# Patient Record
Sex: MALE | Race: WHITE | NOT HISPANIC OR LATINO | Employment: FULL TIME | URBAN - METROPOLITAN AREA
[De-identification: names, ages, dates, MRNs, and addresses within clinical notes are randomized per-mention and may not be internally consistent; named-entity substitution may affect disease eponyms.]

---

## 2017-04-07 ENCOUNTER — TELEPHONE (OUTPATIENT)
Dept: GASTROENTEROLOGY | Facility: CLINIC | Age: 27
End: 2017-04-07

## 2017-04-07 NOTE — TELEPHONE ENCOUNTER
Spoke to Mr. Mcdaniel.  He has outside records from [facility] he will be emailing me.  Let him know once I receive the records i will be reaching out to get him scheduled with Dr. TIFFANY Pulido.    Pt verbalizes understanding and appreciates the call.

## 2017-04-10 ENCOUNTER — TELEPHONE (OUTPATIENT)
Dept: GASTROENTEROLOGY | Facility: CLINIC | Age: 27
End: 2017-04-10

## 2017-04-12 ENCOUNTER — TELEPHONE (OUTPATIENT)
Dept: GASTROENTEROLOGY | Facility: CLINIC | Age: 27
End: 2017-04-12

## 2017-04-12 NOTE — TELEPHONE ENCOUNTER
Attempting to reach Mr. Mcdaniel to give him update on status of appt.    Awaiting records from [facility].    Message left to please return call to office.

## 2017-04-13 ENCOUNTER — TELEPHONE (OUTPATIENT)
Dept: GASTROENTEROLOGY | Facility: CLINIC | Age: 27
End: 2017-04-13

## 2017-04-19 ENCOUNTER — TELEPHONE (OUTPATIENT)
Dept: GASTROENTEROLOGY | Facility: CLINIC | Age: 27
End: 2017-04-19

## 2017-04-19 NOTE — TELEPHONE ENCOUNTER
Creating an encounter to access care everywhere for patient's upcoming appointment on 4/24/2017 with Dr. Pulido.    KG

## 2017-04-24 ENCOUNTER — OFFICE VISIT (OUTPATIENT)
Dept: GASTROENTEROLOGY | Facility: CLINIC | Age: 27
End: 2017-04-24
Payer: COMMERCIAL

## 2017-04-24 ENCOUNTER — TELEPHONE (OUTPATIENT)
Dept: ENDOSCOPY | Facility: HOSPITAL | Age: 27
End: 2017-04-24

## 2017-04-24 ENCOUNTER — TELEPHONE (OUTPATIENT)
Dept: GASTROENTEROLOGY | Facility: CLINIC | Age: 27
End: 2017-04-24

## 2017-04-24 VITALS
DIASTOLIC BLOOD PRESSURE: 81 MMHG | SYSTOLIC BLOOD PRESSURE: 134 MMHG | WEIGHT: 160.69 LBS | RESPIRATION RATE: 18 BRPM | TEMPERATURE: 98 F | BODY MASS INDEX: 23.8 KG/M2 | HEIGHT: 69 IN | HEART RATE: 52 BPM

## 2017-04-24 DIAGNOSIS — K52.9 CHRONIC NONSPECIFIC COLITIS: ICD-10-CM

## 2017-04-24 DIAGNOSIS — Z12.11 SPECIAL SCREENING FOR MALIGNANT NEOPLASMS, COLON: Primary | ICD-10-CM

## 2017-04-24 PROCEDURE — 1160F RVW MEDS BY RX/DR IN RCRD: CPT | Mod: S$GLB,,, | Performed by: INTERNAL MEDICINE

## 2017-04-24 PROCEDURE — 99205 OFFICE O/P NEW HI 60 MIN: CPT | Mod: S$GLB,,, | Performed by: INTERNAL MEDICINE

## 2017-04-24 PROCEDURE — 99999 PR PBB SHADOW E&M-EST. PATIENT-LVL III: CPT | Mod: PBBFAC,,, | Performed by: INTERNAL MEDICINE

## 2017-04-24 RX ORDER — MESALAMINE 400 MG/1
1200 CAPSULE, DELAYED RELEASE ORAL 2 TIMES DAILY
Qty: 180 CAPSULE | Refills: 3 | Status: SHIPPED | OUTPATIENT
Start: 2017-04-24 | End: 2017-07-23

## 2017-04-24 RX ORDER — SODIUM, POTASSIUM,MAG SULFATES 17.5-3.13G
1 SOLUTION, RECONSTITUTED, ORAL ORAL ONCE
Qty: 1 BOTTLE | Refills: 0 | Status: SHIPPED | OUTPATIENT
Start: 2017-04-24 | End: 2017-04-24

## 2017-04-24 RX ORDER — ASPIRIN/CALCIUM CARB/MAGNESIUM 325 MG
4 TABLET ORAL 4 TIMES DAILY
COMMUNITY

## 2017-04-24 NOTE — PROGRESS NOTES
[facility]          Inflammatory Bowel Disease New Patient Consultation Note            Dr. Carmen Pulido    TODAY'S VISIT DATE:  4/24/2017    Reason for Consult:    Chief Complaint   Patient presents with    GI Problem     Microscopic colitis non specific       PCP: Primary Doctor No      Referring MD:   Self Referral    History of Present Illness:  Jonas Morin who is a 26 y.o. male seen today at the [facility] on 04/24/2017 for nonspecific microscopic inflammation of the colon and terminal ileum.  Pertinent past medical history includes  sensory integration, fine motor disability, and auditory processing disorders, anxiety and OCD (on cymbalta), history of Lyme disease, and sporadic adenomatous colon polyps.     As you know, Jonas Morin was doing well until age 6 years old in 1996 when he had baseline of sensory integration, fine motor disability, and auditory processing disorders due to environmental exposure involving mold.  He was seen at the [facility] in 8/2008 for a 2nd opinion for GI symptoms.  He developed symptoms associated with a trip from [place] in 2006 at which time he developed abrupt onset of diarrhea and emesis that lasted for several hours though the diarrhea persisted at 3-4 loose stools/day.  In May 2008 she began with an exacerbation of 11 BMs/day associated with urgency, intermittent blood, and no nocturnal BMs. AT that time he also had an  exacerbation of his SI auditory processing and cognitive problems and was seen by pediatric neurologist/psychiatrist who recommended gluten-free, casein-free diets.  Patient was treated with micronutrient deficiency with supplements in 3/2008 but no longer taking that since 2009.  A colonoscopy on 5/15/08 showed  visually unremarkable except for a rectal polyp which was described as a tubular adenoma and normal TI with biopsies of the TI showing focal minimal acute enteritis with rare  "neutrophils in the villous tips and crypts in the TI and focal acute colitis with cryptitis, crypt abscesses, and neutrophils on the lamina propria. He had EGD 8/2008 which was normal with biopsies negative for both celiac disease (on gluten free diet at time of EGD) and H pylori.  In 5/2008 he was treated with xifaxan 200 TID X 1 week, cipro 500 BID X 1 week, flagyl 250 mg BID, and Imodium x 3 weeks with no relief.  He was then switched to colazal and bentyl was recommended but the family deferred due to neurologic issues. There were plans at that time to proceed with EGD, breath testing, stool studies, labs and to continue the colazal but unclear if this was ever done. In 10/2008 he was swtiched from colazal to asacol and was feeling better as he continued on a gluten free/casein free diet.  He was at that time having 2-3 loose to formed BMs/day.  Colonoscopy 10/2008 showed normal colon endoscopically with rectal polyp. The rectal polyp pathology showed " adenoma-like low grade dysplasia." The TI biopsies showed focal active enteritis with intraepithelial lymphocytosis and random colon biopsies show patchy active colitis.  From 2009 to 2012 he had regular clinic visits and continued on asacol and was having 1-2 soft BMs/day with no frequency or blood, good appetite and energy. At that time again he remained on the same gluten-free, casein-free diet but had persistent tremor in his hand.     In 7/2012 he was diagnosed with Lyme disease (symptoms since fall 2011) and was on Doxycycline for 3 weeks. In 2/2014 he saw Dr. Eben Moreno at the [facility].  He took doxycycline for 6 weeks during this time.  In approximately 7/2013 he switched from asacol to delzicol due to asacol no longer being manufactured and continues on delzicol 1.2 grams BID. He continues with residual joint pains of his hands from the Lyme disease. He had continued bilateral hip pain attributed to shallow " ""hip sockets."  He saw rheumatologist at [facility] and notes did not suggest arthritis but had likely a "strain".  His last colonoscopy was done 2015 which showed normal TI and colon with all biopsies normal. In approximately spring 2016 he went from having 1-2 formed BMs/day and this has gradually worsened to now 5 loose BMs/day. He has now had 5 loose Bms/day for the past 7-8 months. He has lower abdominal cramping and urgency prior to a BM and 3 out of 5 of his bowel movements are before noon with no nocturnal bowel movements.   He graduated from [employer] with business management degree and moved to [place] from [place] in 2016.  He enjoys his work and exercises regularly.     Patient has no other gastrointestinal/constitutional complaints including no fevers or chills, weight loss, nausea/vomiting (including no hematemesis), dysphagia, abdominal pain. Also patient does not have any extraintestinal manifestations of their inflammatory bowel disease including no eye pain/redness, skin lesions/rashes, joint problems, oral ulcers.    Pertinent Endoscopy/Imagin/15/2008 colonoscopy (per [facility] note--do not have actual report) (done by another GI MD) was visually unremarkable except for a rectal polyp which was described as a tubular adenoma and normal TI (path: focal minimal acute enteritis with rare neutrophils in the villous tips and crypts in the TI, focal acute colitis with cryptitis, crypt abscesses, and neutrophils on the lamina propria)  2008 EGD: normal (path: normal with negative celiac and H. pylori)  10/30/2008 Colonoscopy: normal with a rectal polyp (not discussed in report) (path-rectal polyp: adenoma-like low grade dysplasia) (path-TI: focal active enteritis with intraepithelial lymphocytosis) (path-random colon: patchy active colitis)  2012 DEXA normal  2015 Dexa scan: normal  2015 colonoscopy: normal TI (path: normal); entire examined colon appeared normal " (path: normal)    Pertinent Labs:  No results found for: STOOLCULTURE, SCRPDEFIBG4U, [id number], CDIFFICILEAN, CDIFFTOX, CDIFFICILEBY  No results found for: CRP  No results found for: [id number]  No results found for: HEPBIGM, HEPBCAB, HEPBSURFABQU  No results found for: VARICELLAZOS, VARICELLAINT  No results found for: NIL, TBAG, TBAGNIL, MITOGENNIL, TBGOLD  No results found for: TPMTRESULT  No results found for: ANSADAINIT, INFLIXIMAB, INFLIXINTERP    Prior IBD Therapies:  Xifaxan  Cipro  Flagyl  Imodium  Colazal  Asacol 800 mg TID    Current IBD Therapies:  Delzicol 1.2 grams BID    Vaccinations:  Flu shot: yearly  Chickenpox status/Varicella vaccine: had chickenpox as a child  No results found for: VARICELLAZOS, VARICELLAINT  Tetanus: will review at next visit  Pneumonia vaccine: not indicated  Hepatitis B: will review at next visit, HBsAb ordered today  No results found for: HEPBSAB  Hepatitis A: NA  HPV: will discuss at next visit  Meningococcal: will discuss at next visit     NSAID use/indication:  advil prn- once a week- joint and hip pain    Narcotic use:  no    Alternative/Complementary Meds for IBD:  no    Review of Systems   Constitutional: Negative for chills, fever and weight loss.   HENT:        No oral ulcers, dysphagia, oral thrush   Eyes: Negative for blurred vision, pain and redness.   Respiratory: Negative for cough and shortness of breath.    Cardiovascular: Negative for chest pain.   Gastrointestinal: Positive for nausea. Negative for abdominal pain, heartburn and vomiting.   Genitourinary: Negative for dysuria and hematuria.   Musculoskeletal: Negative for back pain and joint pain.   Skin: Negative for rash.   Psychiatric/Behavioral: Positive for depression. The patient is nervous/anxious. The patient does not have insomnia.      Medical/Surgical History:    has a past medical history of Microscopic colitis and OCD (obsessive compulsive disorder).   has a past surgical history that  includes Tympanoplasty; Colonoscopy; and Upper gastrointestinal endoscopy.    Family History: family history is negative for Celiac disease, Cirrhosis, Colon cancer, Colon polyps, Crohn's disease, Cystic fibrosis, Esophageal cancer, Hemochromatosis, Inflammatory bowel disease, Irritable bowel syndrome, Liver cancer, Liver disease, Rectal cancer, Stomach cancer, Ulcerative colitis, Denny's disease, Tuberculosis, Rheum arthritis, and Scleroderma..     Social History:  reports that he quit smoking about 8 years ago. He quit smokeless tobacco use about 4 years ago. His smokeless tobacco use included Chew. He reports that he drinks alcohol. He reports that he does not use illicit drugs.    Review of patient's allergies indicates:   Allergen Reactions    Codeine Nausea And Vomiting     Outpatient Prescriptions Marked as Taking for the 4/24/17 encounter (Office Visit) with Carmen Pulido MD   Medication Sig Dispense Refill    duloxetine (CYMBALTA) 30 MG capsule Take 1 capsule (30 mg total) by mouth once daily. 30 capsule 11    mesalamine 400 mg cdti Take 1,200 mg by mouth 2 (two) times daily. 540 each 1    nicotine polacrilex 4 MG Lozg Take 4 mg by mouth 4 (four) times daily.       Vital Signs:  BP: 134/81 Temp: 98.1 °F (36.7 °C) Pulse: (!) 52 Resp: 18  Weight: 72.9 kg (160 lb 11.5 oz)    Physical Exam   Constitutional: He is oriented to person, place, and time. He appears well-developed.   HENT:   Mouth/Throat: Oropharynx is clear and moist. No oral lesions.   Eyes: Conjunctivae are normal. Pupils are equal, round, and reactive to light.   Cardiovascular: Normal rate and regular rhythm.    Pulmonary/Chest: Effort normal and breath sounds normal.   Abdominal: Soft. There is no tenderness.   Neurological: He is alert and oriented to person, place, and time.   Skin: No rash noted.   Psychiatric: He has a normal mood and affect.   Nursing note and vitals reviewed.    Labs:  No results found for: STOOLCULTURE,  [id number], CCJLSRQVQV4L, CDIFFICILEAN, CDIFFTOX, CDIFFICILEBY  No results found for: WBC, HGB, HCT, MCV, PLT, ALT, AST, GGT, ALKPHOS, BILITOT, TSH, FREET4, SEDRATE, CRP, TTGIGA, IGA, [id number]  No results found for: HEPBIGM, HEPBCAB, HEPBSAB, HEPBSURFABQU  No results found for: VARICELLAZOS, VARICELLAINT  No results found for: NIL, TBAG, TBAGNIL, MITOGENNIL, TBGOLD  No results found for: TPMTRESULT  No results found for: ANSADAINIT, INFLIXIMAB, INFLIXINTERP    Assessment/Plan:  Jonas Morin is a 26 y.o. male with a diagnosis of nonspecific microscopic inflammation of the colon and terminal ileum (5/2008, 10/2008), colonoscopy with ileoscopy and biopsies normal 7/2015 in 2008.   Pertinent past medical history includes  sensory integration, fine motor disability, and auditory processing disorders, anxiety and OCD (on cymbalta), history of Lyme disease, and sporadic adenomatous colon polyps.  He initially began with symptoms of nonbloody diarrhea and vomiting in 2006 during a trip to [place].  The diarrhea persisted and worsened by May 2008 to 11 loose-watery BMs/day with urgency, intermittent blood, and no nocturnal BMs.  At that time he also reported an exacerbation of his SI auditory processing and cognitive problems and recommended gluten-free and casein-free diets.  During that same time he was also treated with supplements for a micronutrient deficiency.  His initial colonoscopy at [facility] with Dr. Cisneros on 5/15/2008 was visually unremarkable except for a rectal polyp that pathology described as a tubular adenoma.  Biopsies of his TI showed focal minimal acute enteritis and of his colon showed focal acute colitis with cryptitis, crypt abscesses, and neutrophils on the lamina propria.  EGD in 8/2008 was normal and biopsies were negative for celiac and H. Pylori though he was on a gluten free diet at that time.  He was treated with xifaxan, cipro, flagyl, and Imodium with no relief.  Colazal and bentyl  "were recommended but family deferred due to neurologic issues.  By fall 2008, he was on asacol and had a normal colonoscopy with a rectal polyp that showed "adenoma-like low grade dysplasia."   He continued to do well on asacol from [id number] with 1-2 soft BMs/day.  In 7/2012 he was diagnosed with Lyme disease (symptoms since fall 2011) and was treated with Doxycycline for 3 weeks.  He had a 2nd opinion with Dr. MIRIAM Moreno  at the [facility] and took doxycycline for an additional 6 weeks.  His asacol was switched to delzicol 1.2 gm/BID in 2013 and he remains on this dose currently.  He most recent colonoscopy in 7/2015 was normal with normal biopsies.  By spring 2016 his symptoms worsened to 5 loose BMs/day with lower abdominal cramping and urgency prior to a BM and these symptoms have continued for the last 7-8 months.      Jonas has had a longstanding history of GI symptoms that began shortly after a trip to [place] in 2006.  There may be a component of post-infectious IBS though he has had some nonspecific microscopic inflammation of his colon and TI. This does not support a diagnosis of lymphocytic/collagenous colitis due to histology and demographics not supporting this diagnosis.  He has been on oral mesalamine for a number of years which has been helpful though for the past year he has gone from 1-2 formed BMs/day to 5-6 loose BMs/day.  I don't know if this is worsening nonspecific colitis though if normal endoscopy then this could be IBS that has worsened since recent move and other stressors. I may consider stool studies if normal colonoscopy. Furthermore patient has had removal of rectal adenomas in 2008 so he needs regular surveillance colonoscopies.   We will proceed with a colonoscopy in 4-8 weeks after he discontinues NSAIDs for at least 4 weeks.     # Loose stools since spring 2016:   - unlikely to be infectious given chronicity  - will consider stool E " histolytica, cultures depending on colonoscopy results   - TTG IgA and total serum IgA to assess for celiac disease  - colonoscopy with ileoscopy in 4-8 weeks    # Nonspecific microscopic inflammation of the colon and terminal ileum (5/2008, 10/2008), colonoscopy with ileoscopy and biopsies normal 7/2015  - repeat colonoscopy due to worsening symptoms over past year though stable over past 8 mos at 5 loose BMs/day  - will stop NSAIDS indefinitely but at least 4 weeks prior to colonoscopy  - if colonoscopy normal will consider stool studies, VCE-may consider if needed in future but don't see utility at this time; immodium prn may be option to help with overall QOL  - basic labs: CBC, CMP, ESR, CRP  - drug monitoring labs: Cr/UA yearly; will order UA at next visit     # High risk for colon cancer- rectal tubular adenomas removed (5/2008, 10/2008)   - last colonoscopy 7/2015- normal  - repeat colonoscopy for surveillance every 5 years  - pt to be scheduled in next 4-8 weeks due to loose stools  - will remind patient at next visit to alert first degree family members to get screened early due to his polyps    # Health maintenance  Opthamologic exam recommended yearly    Dermatologic exam recommended yearly     Bone health:  Risk of osteopenia/osteoporosis:  Risks factors: none  Vitamin D: No results found for: [id number]--yjsiebr today  Vitamin B12: ordered today due to previous nonspecific microscopic ileitis    Vaccine counseling:  - VZV IgG, HBsAb today   - routine vaccines per age    Follow up: 2 weeks after colonoscopy with Dr. Pulido    Total visit time was 60 minutes, more than 50% of which was spent in face-to-face counseling with patient regarding evaluation and management goals and treatment options for GI symptoms    Thank you again for sending Jonas Morin to see Dr. Carmen Pulido today at the [facility]. Please don't hesitate to contact Dr. Pulido if there are any questions  regarding this evaluation, or if you have any other patients with inflammatory bowel disease for whom you would like a consultation. You can reach Dr. Pulido at [phone] or by email at [email or web address]    Carmen Pulido MD  Department of Gastroenterology  Medical Director, Inflammatory Bowel Disease

## 2017-04-24 NOTE — MR AVS SNAPSHOT
[facility]  [address]  [place] [address]  Phone: [phone]  Fax: [phone]                  Jonas Morin   2017 11:00 AM   Office Visit    Description:  Male : 1990   Provider:  Carmen Pulido MD   Department:  [facility]           Reason for Visit     GI Problem           Diagnoses this Visit        Comments    Chronic nonspecific colitis                To Do List           Future Appointments        Provider Department Dept Phone    2017 1:05 PM LAB, APPOINTMENT [place] [facility] [phone]    2017 2:00 PM Carmen Pulido MD [facility] [phone]      Your Future Surgeries/Procedures     2017   Surgery with Carmen Pulido MD   [facility])    [address] [facility] [address] [phone]              Goals (5 Years of Data)     None       These Medications        Disp Refills Start End    mesalamine (DELZICOL) 400 mg cdti 180 capsule 3 2017    Take 3 capsules (1,200 mg total) by mouth 2 (two) times daily. - Oral    Pharmacy: [facility]/pharmacy #[id number] - [address] #: [phone]         [facility]     [facility] Nurse Care Line -  Assistance  Unless otherwise directed by your provider, please contact [facility], our nurse care line that is available for  assistance.     Registered nurses in the [facility] provide: appointment scheduling, clinical advisement, health education, and other advisory services.  Call: [phone] (toll free)               Medications           Message regarding Medications     Verify the changes and/or additions to your medication regime listed below are the same as discussed with your clinician today.  If any of these changes or additions are incorrect, please notify your healthcare provider.       "  START taking these NEW medications        Refills    mesalamine (DELZICOL) 400 mg cdti 3    Sig: Take 3 capsules (1,200 mg total) by mouth 2 (two) times daily.    Class: Normal    Route: Oral           Verify that the below list of medications is an accurate representation of the medications you are currently taking.  If none reported, the list may be blank. If incorrect, please contact your healthcare provider. Carry this list with you in case of emergency.           Current Medications     duloxetine (CYMBALTA) 30 MG capsule Take 1 capsule (30 mg total) by mouth once daily.    nicotine polacrilex 4 MG Lozg Take 4 mg by mouth 4 (four) times daily.    mesalamine (DELZICOL) 400 mg cdti Take 3 capsules (1,200 mg total) by mouth 2 (two) times daily.    sodium,potassium,mag sulfates (SUPREP BOWEL PREP KIT) 17.5-3.13-1.6 gram SolR Take 1 each by mouth once.           Clinical Reference Information           Your Vitals Were     BP Pulse Temp Resp Height Weight    134/81 52 98.1 °F (36.7 °C) (Oral) 18 5' 9" (1.753 m) 72.9 kg (160 lb 11.5 oz)    BMI                23.73 kg/m2          Blood Pressure          Most Recent Value    BP  134/81      Allergies as of 4/24/2017     Codeine      Immunizations Administered on Date of Encounter - 4/24/2017     None      Orders Placed During Today's Visit      Normal Orders This Visit    Case request GI: COLONOSCOPY     Future Labs/Procedures Expected by Expires    C-reactive protein  4/24/2017 6/23/2018    CBC auto differential  4/24/2017 6/23/2018    Comprehensive metabolic panel  4/24/2017 6/23/2018    Hepatitis B surface antibody  4/24/2017 6/23/2018    IgA  4/24/2017 6/23/2018    Sedimentation rate, manual  4/24/2017 6/23/2018    Tissue transglutaminase, IgA  4/24/2017 6/23/2018    Varicella zoster antibody, IgG  4/24/2017 6/23/2018      Instructions    Instructions:  - labs today  - colonoscopy in the next 4-8 weeks  - continue Delzicol 1.2 gm twice daily--RX sent to " pharmacy  - Avoid all NSAIDs (Advil, Ibuprofen, Motrin, Aspirin, Naprosyn, Aleve)--take none until colonoscopy  - Follow up with Dr. Pulido 2 weeks after colonoscopy                   Language Assistance Services     ATTENTION: Language assistance services are available, free of charge. Please call [phone].      ATENCIÓN: Si habla boo, tiene a lynch disposición servicios gratuitos de asistencia lingüística. Llame al [phone].     CHÚ Ý: N?u b?n nói Ti?ng Vi?t, có các d?ch v? h? tr? ngôn ng? mi?n phí dành cho b?n. G?i s? [phone].         [facility] complies with applicable Federal civil rights laws and does not discriminate on the basis of race, color, national origin, age, disability, or sex.

## 2017-04-24 NOTE — PATIENT INSTRUCTIONS
Instructions:  - labs today  - colonoscopy in the next 4-8 weeks  - continue Delzicol 1.2 gm twice daily--RX sent to pharmacy  - Avoid all NSAIDs (Advil, Ibuprofen, Motrin, Aspirin, Naprosyn, Aleve)--take none until colonoscopy  - Follow up with Dr. Pulido 2 weeks after colonoscopy

## 2017-06-12 ENCOUNTER — ANESTHESIA EVENT (OUTPATIENT)
Dept: ENDOSCOPY | Facility: HOSPITAL | Age: 27
End: 2017-06-12
Payer: COMMERCIAL

## 2017-06-13 ENCOUNTER — HOSPITAL ENCOUNTER (OUTPATIENT)
Facility: HOSPITAL | Age: 27
Discharge: HOME | End: 2017-06-13
Attending: INTERNAL MEDICINE | Admitting: INTERNAL MEDICINE
Payer: COMMERCIAL

## 2017-06-13 ENCOUNTER — SURGERY (OUTPATIENT)
Age: 27
End: 2017-06-13

## 2017-06-13 ENCOUNTER — ANESTHESIA (OUTPATIENT)
Dept: ENDOSCOPY | Facility: HOSPITAL | Age: 27
End: 2017-06-13
Payer: COMMERCIAL

## 2017-06-13 VITALS
OXYGEN SATURATION: 100 % | BODY MASS INDEX: 23.7 KG/M2 | DIASTOLIC BLOOD PRESSURE: 53 MMHG | RESPIRATION RATE: 12 BRPM | HEART RATE: 45 BPM | SYSTOLIC BLOOD PRESSURE: 108 MMHG | WEIGHT: 160 LBS | HEIGHT: 69 IN | TEMPERATURE: 98 F | RESPIRATION RATE: 17 BRPM

## 2017-06-13 DIAGNOSIS — K52.9 CHRONIC NONSPECIFIC COLITIS: Primary | ICD-10-CM

## 2017-06-13 PROCEDURE — 88305 TISSUE EXAM BY PATHOLOGIST: CPT | Mod: 26,,, | Performed by: PATHOLOGY

## 2017-06-13 PROCEDURE — 27201012 HC FORCEPS, HOT/COLD, DISP: Performed by: INTERNAL MEDICINE

## 2017-06-13 PROCEDURE — 25000003 PHARM REV CODE 250: Performed by: INTERNAL MEDICINE

## 2017-06-13 PROCEDURE — 37000008 HC ANESTHESIA 1ST 15 MINUTES: Performed by: INTERNAL MEDICINE

## 2017-06-13 PROCEDURE — 25000003 PHARM REV CODE 250: Performed by: NURSE ANESTHETIST, CERTIFIED REGISTERED

## 2017-06-13 PROCEDURE — 45380 COLONOSCOPY AND BIOPSY: CPT | Performed by: INTERNAL MEDICINE

## 2017-06-13 PROCEDURE — D9220A PRA ANESTHESIA: Mod: ANES,,, | Performed by: ANESTHESIOLOGY

## 2017-06-13 PROCEDURE — 88342 IMHCHEM/IMCYTCHM 1ST ANTB: CPT | Mod: 26,,, | Performed by: PATHOLOGY

## 2017-06-13 PROCEDURE — 37000009 HC ANESTHESIA EA ADD 15 MINS: Performed by: INTERNAL MEDICINE

## 2017-06-13 PROCEDURE — D9220A PRA ANESTHESIA: Mod: CRNA,,, | Performed by: NURSE ANESTHETIST, CERTIFIED REGISTERED

## 2017-06-13 PROCEDURE — 88305 TISSUE EXAM BY PATHOLOGIST: CPT | Performed by: PATHOLOGY

## 2017-06-13 PROCEDURE — 63600175 PHARM REV CODE 636 W HCPCS: Performed by: NURSE ANESTHETIST, CERTIFIED REGISTERED

## 2017-06-13 PROCEDURE — 45380 COLONOSCOPY AND BIOPSY: CPT | Mod: ,,, | Performed by: INTERNAL MEDICINE

## 2017-06-13 PROCEDURE — 88312 SPECIAL STAINS GROUP 1: CPT | Mod: 26,,, | Performed by: PATHOLOGY

## 2017-06-13 RX ORDER — LIDOCAINE HCL/PF 100 MG/5ML
SYRINGE (ML) INTRAVENOUS
Status: DISCONTINUED | OUTPATIENT
Start: 2017-06-13 | End: 2017-06-13

## 2017-06-13 RX ORDER — SODIUM CHLORIDE 9 MG/ML
INJECTION, SOLUTION INTRAVENOUS CONTINUOUS
Status: DISCONTINUED | OUTPATIENT
Start: 2017-06-13 | End: 2017-06-13 | Stop reason: HOSPADM

## 2017-06-13 RX ORDER — PROPOFOL 10 MG/ML
VIAL (ML) INTRAVENOUS
Status: DISCONTINUED | OUTPATIENT
Start: 2017-06-13 | End: 2017-06-13

## 2017-06-13 RX ORDER — PROPOFOL 10 MG/ML
VIAL (ML) INTRAVENOUS CONTINUOUS PRN
Status: DISCONTINUED | OUTPATIENT
Start: 2017-06-13 | End: 2017-06-13

## 2017-06-13 RX ADMIN — SODIUM CHLORIDE: 0.9 INJECTION, SOLUTION INTRAVENOUS at 07:06

## 2017-06-13 RX ADMIN — PROPOFOL 125 MCG/KG/MIN: 10 INJECTION, EMULSION INTRAVENOUS at 08:06

## 2017-06-13 RX ADMIN — PROPOFOL 30 MG: 10 INJECTION, EMULSION INTRAVENOUS at 08:06

## 2017-06-13 RX ADMIN — LIDOCAINE HYDROCHLORIDE 50 MG: 20 INJECTION, SOLUTION INTRAVENOUS at 07:06

## 2017-06-13 RX ADMIN — PROPOFOL 50 MG: 10 INJECTION, EMULSION INTRAVENOUS at 08:06

## 2017-06-13 NOTE — PATIENT INSTRUCTIONS
Discharge Summary/Instructions after an Endoscopic Procedure  Patient Name: Jonas Morin  Patient MRN: [id number]  Patient YOB: 1990 Tuesday, June 13, 2017  Carmen Pulido MD  RESTRICTIONS ON ACTIVITY:  - DO NOT drive a car, operate machinery, make legal/financial decisions, or   drink alcohol until the day after the procedure.    - The following day: return to full activity including work, except no heavy   lifting, straining or running for 3 days if polyps were removed.  - Diet: Eat and drink normally unless instructed otherwise.  TREATMENT FOR COMMON SIDE EFFECTS:  - Mild abdominal pain, bloating or excessive gas: rest, eat lightly and use   a heating pad.  - Sore Throat - treat with throat lozenges. Gargle with warm salt water.  SYMPTOMS TO WATCH FOR AND REPORT TO YOUR PHYSICIAN:  1. Severe abdominal pain or bloating.  2. Pain in chest.  3. Chills or fever occurring within 24 hours after a procedure.  4. A large amount of rectal bleeding, which would show as bright red,   maroon, or black stools. (A small amount of blood from the rectum is not   serious, especially if hemorrhoids are present.)  5. Because air was used during the procedure, expelling large amounts of air   from your rectum or belching is normal.  6. If a bowel prep was taken, you may not have a bowel movement for 1-3   days.  This is normal.  7. Go directly to the emergency room if you notice any of the following:   Chills and/or fever over 101 F   Persistent vomiting   Severe abdominal pain, other than gas cramps   Severe chest pain   Black, tarry stools   Any bleeding - exceeding one tablespoon  Your doctor recommends these additional instructions:  If any biopsies were performed, my office will call you in 5 to 6 business   days with any results.  You are being discharged to home.   You have a contact number available for emergencies.  The signs and symptoms   of potential delayed complications were discussed with you.  You may  return   to normal activities tomorrow.  Written discharge instructions were   provided to you.   Resume your previous diet.   Continue your present medications.   We are waiting for your pathology results.   Your physician has recommended a repeat colonoscopy.  The date of your   future colonoscopy will be determined after pathology results from today's   exam become available for review by your physician.   Return to your GI clinic as previously scheduled.  For questions, problems or results please call your physician - Carmen Pulido MD at Work:  [phone].  [facility], EMERGENCY ROOM PHONE NUMBER: [phone]  IF A COMPLICATION OR EMERGENCY SITUATION ARISES AND YOU ARE UNABLE TO REACH   YOUR PHYSICIAN - GO TO THE EMERGENCY ROOM.  Carmen Pulido MD  6/13/2017 8:26:27 AM  This report has been verified and signed electronically.

## 2017-06-13 NOTE — H&P
Short Stay Endoscopy History and Physical    PCP - Primary Doctor No  Referring Physician - Carmen Pulido MD  [address] [place] [address]    Procedure - Colonoscopy  ASA - per anesthesia  Mallampati - per anesthesia  History of Anesthesia problems - no  Family history Anesthesia problems -  no   Plan of anesthesia - General    HPI  26 y.o. male  Reason for procedure: nonspecific colitis      ROS:  Constitutional: No fevers, chills, No weight loss  CV: No chest pain  Pulm: No cough, No shortness of breath  GI: see HPI    Medical History:  has a past medical history of Anxiety; History of Lyme disease; Nonspecific colitis; and OCD (obsessive compulsive disorder).    Surgical History:  has a past surgical history that includes Tympanoplasty; Colonoscopy; and Upper gastrointestinal endoscopy.    Family History: family history includes Pancreatic cancer in his paternal grandfather.. Otherwise no colon cancer, inflammatory bowel disease, or GI malignancies.    Social History:  reports that he quit smoking about 8 years ago. He quit smokeless tobacco use about 4 years ago. His smokeless tobacco use included Chew. He reports that he drinks alcohol. He reports that he does not use drugs.    Review of patient's allergies indicates:   Allergen Reactions    Codeine Nausea And Vomiting       Medications:   Prescriptions Prior to Admission   Medication Sig Dispense Refill Last Dose    duloxetine (CYMBALTA) 30 MG capsule Take 1 capsule (30 mg total) by mouth once daily. 30 capsule 11 6/12/2017 at Unknown time    mesalamine (DELZICOL) 400 mg cdti Take 3 capsules (1,200 mg total) by mouth 2 (two) times daily. 180 capsule 3 6/12/2017 at Unknown time    nicotine polacrilex 4 MG Lozg Take 4 mg by mouth 4 (four) times daily.   6/12/2017 at Unknown time       Physical Exam:    Vital Signs:   Vitals:    06/13/17 0737   BP: (!) 114/54   Pulse: (!) 56   Resp: 12   Temp: 98.7 °F (37.1 °C)       General Appearance:  Well appearing in no acute distress  Lungs: CTA anteriorly  Heart:  Regular rate, S1, S2 normal, no murmurs heard.  Abdomen: Soft, non tender, non distended with normal bowel sounds, no masses  Extremities: No edema    Labs:  Lab Results   Component Value Date    WBC 3.40 (L) 04/24/2017    HGB 15.4 04/24/2017    HCT 42.6 04/24/2017     04/24/2017    ALT 29 04/24/2017    AST 27 04/24/2017     04/24/2017    K 3.7 04/24/2017     04/24/2017    CREATININE 1.1 04/24/2017    BUN 18 04/24/2017    CO2 25 04/24/2017       I have explained the risks and benefits of this endoscopic procedure to the patient including but not limited to bleeding, inflammation, infection, perforation, and death.      Carmen Pulido MD

## 2017-06-13 NOTE — ANESTHESIA PREPROCEDURE EVALUATION
06/13/2017  Jonas Morin is a 26 y.o., male   Pre-operative evaluation for Procedure(s) (LRB):  COLONOSCOPY (N/A)    Jonas Morin is a 26 y.o. male    History of UC for colonscopy       Active problems:    Prev airway:       Review of patient's allergies indicates:   Allergen Reactions    Codeine Nausea And Vomiting        No current facility-administered medications on file prior to encounter.      Current Outpatient Prescriptions on File Prior to Encounter   Medication Sig Dispense Refill    duloxetine (CYMBALTA) 30 MG capsule Take 1 capsule (30 mg total) by mouth once daily. 30 capsule 11    mesalamine (DELZICOL) 400 mg cdti Take 3 capsules (1,200 mg total) by mouth 2 (two) times daily. 180 capsule 3    nicotine polacrilex 4 MG Lozg Take 4 mg by mouth 4 (four) times daily.         Past Surgical History:   Procedure Laterality Date    COLONOSCOPY      TYMPANOPLASTY      UPPER GASTROINTESTINAL ENDOSCOPY         Social History     Social History    Marital status: Single     Spouse name: N/A    Number of children: N/A    Years of education: N/A     Occupational History    Not on file.     Social History Main Topics    Smoking status: Former Smoker     Quit date: 4/24/2009    Smokeless tobacco: Former User     Types: Chew     Quit date: 4/24/2013    Alcohol use Yes      Comment: weekends- few beers    Drug use: No    Sexual activity: Yes     Partners: Female     Other Topics Concern    Not on file     Social History Narrative    -uses nicotine lozenge 4 mg four times daily             -          Vital Signs Range (Last 24H):  Wt Readings from Last 3 Encounters:   04/24/17 72.9 kg (160 lb 11.5 oz)   09/12/16 74.8 kg (164 lb 14.5 oz)     Temp Readings from Last 3 Encounters:   04/24/17 36.7 °C (98.1 °F) (Oral)   09/12/16 36.9 °C (98.5 °F) (Oral)     BP Readings from Last 3  Encounters:   04/24/17 134/81   09/12/16 129/78     Pulse Readings from Last 3 Encounters:   04/24/17 (!) 52   09/12/16 86         CBC:   Lab Results   Component Value Date    WBC 3.40 (L) 04/24/2017    HGB 15.4 04/24/2017    HCT 42.6 04/24/2017    MCV 85 04/24/2017     04/24/2017       CMP: CMP  Sodium   Date Value Ref Range Status   04/24/2017 141 136 - 145 mmol/L Final     Potassium   Date Value Ref Range Status   04/24/2017 3.7 3.5 - 5.1 mmol/L Final     Chloride   Date Value Ref Range Status   04/24/2017 104 95 - 110 mmol/L Final     CO2   Date Value Ref Range Status   04/24/2017 25 23 - 29 mmol/L Final     Glucose   Date Value Ref Range Status   04/24/2017 83 70 - 110 mg/dL Final     BUN, Bld   Date Value Ref Range Status   04/24/2017 18 6 - 20 mg/dL Final     Creatinine   Date Value Ref Range Status   04/24/2017 1.1 0.5 - 1.4 mg/dL Final     Calcium   Date Value Ref Range Status   04/24/2017 9.7 8.7 - 10.5 mg/dL Final     Total Protein   Date Value Ref Range Status   04/24/2017 7.6 6.0 - 8.4 g/dL Final     Albumin   Date Value Ref Range Status   04/24/2017 4.6 3.5 - 5.2 g/dL Final     Total Bilirubin   Date Value Ref Range Status   04/24/2017 0.7 0.1 - 1.0 mg/dL Final     Comment:     For infants and newborns, interpretation of results should be based  on gestational age, weight and in agreement with clinical  observations.  Premature Infant recommended reference ranges:  Up to 24 hours.............<8.0 mg/dL  Up to 48 hours............<12.0 mg/dL  3-5 days..................<15.0 mg/dL  6-29 days.................<15.0 mg/dL       Alkaline Phosphatase   Date Value Ref Range Status   04/24/2017 56 55 - 135 U/L Final     AST   Date Value Ref Range Status   04/24/2017 27 10 - 40 U/L Final     ALT   Date Value Ref Range Status   04/24/2017 29 10 - 44 U/L Final     Anion Gap   Date Value Ref Range Status   04/24/2017 12 8 - 16 mmol/L Final     eGFR if    Date Value Ref Range Status    2017 >60.0 >60 mL/min/1.73 m^2 Final     eGFR if non    Date Value Ref Range Status   2017 >60.0 >60 mL/min/1.73 m^2 Final     Comment:     Calculation used to obtain the estimated glomerular filtration  rate (eGFR) is the CKD-EPI equation. Since race is unknown   in our information system, the eGFR values for   -American and Non--American patients are given   for each creatinine result.         INR  No results found for: INR, PROTIME        Diagnostic Studies:      EKD Echo:        .    Anesthesia Evaluation         Review of Systems      Physical Exam  General:  Well nourished    Airway/Jaw/Neck:  Airway Findings: Mouth Opening: Normal Tongue: Normal  General Airway Assessment: Adult  Mallampati: I  Jaw/Neck Findings:  Neck ROM: Normal ROM      Dental:  Dental Findings: In tact   Chest/Lungs:  Chest/Lungs Findings: Clear to auscultation     Heart/Vascular:  Heart Findings: Rate: Normal  Rhythm: Regular Rhythm  Sounds: Normal        Mental Status:  Mental Status Findings:  Cooperative         Anesthesia Plan  Type of Anesthesia, risks & benefits discussed:  Anesthesia Type:  general  Patient's Preference: general   Intra-op Monitoring Plan:   Intra-op Monitoring Plan Comments:   Post Op Pain Control Plan:   Post Op Pain Control Plan Comments:   Induction:   IV  Beta Blocker:  Patient is not currently on a Beta-Blocker (No further documentation required).       Informed Consent: Patient understands risks and agrees with Anesthesia plan.  Questions answered. Anesthesia consent signed with patient.  ASA Score: 2     Day of Surgery Review of History & Physical:    H&P update referred to the surgeon.         Ready For Surgery From Anesthesia Perspective.

## 2017-06-13 NOTE — ANESTHESIA POSTPROCEDURE EVALUATION
"Anesthesia Post Evaluation    Patient: Jonas Morin    Procedure(s) Performed: Procedure(s) (LRB):  COLONOSCOPY (N/A)    Final Anesthesia Type: general  Patient location during evaluation: PACU  Patient participation: Yes- Able to Participate  Level of consciousness: awake and alert  Post-procedure vital signs: reviewed and stable  Pain management: adequate  Airway patency: patent  PONV status at discharge: No PONV  Anesthetic complications: no      Cardiovascular status: blood pressure returned to baseline  Respiratory status: unassisted  Hydration status: euvolemic  Follow-up not needed.        Visit Vitals  BP (!) 108/53   Pulse (!) 45   Temp 36.6 °C (97.8 °F)   Resp 17   Ht 5' 9" (1.753 m)   Wt 72.6 kg (160 lb)   SpO2 100%   BMI 23.63 kg/m²       Pain/Anthony Score: Pain Assessment Performed: Yes (6/13/2017  8:55 AM)  Presence of Pain: denies (6/13/2017  8:55 AM)  Pain Rating Prior to Med Admin: 0 (6/13/2017  7:35 AM)  Pain Rating Post Med Admin: 0 (6/13/2017  7:35 AM)      "

## 2017-06-13 NOTE — ANESTHESIA RELEASE NOTE
Anesthesia Release from PACU note    Patient: Jonas Morin    Procedure(s) Performed: Procedure(s) (LRB):  COLONOSCOPY (N/A)    Anesthesia type: general    Post pain: Adequate analgesia    Post assessment: no apparent anesthetic complications, tolerated procedure well and no evidence of recall    Last Vitals:   Vitals:    06/13/17 0854   BP: (!) 108/53   Pulse: (!) 45   Resp: 17   Temp:    SpO2: 100%       Post vital signs: stable    Level of consciousness: awake, alert  and oriented    Nausea/Vomiting: no nausea/no vomiting    Complications: none    Airway Patency: patent    Respiratory: unassisted    Cardiovascular: stable and blood pressure at baseline    Hydration: euvolemic

## 2017-06-13 NOTE — TRANSFER OF CARE
"Anesthesia Transfer of Care Note    Patient: Jonas Morin    Procedure(s) Performed: Procedure(s) (LRB):  COLONOSCOPY (N/A)    Patient location: GI    Anesthesia Type: general    Transport from OR: Transported from OR on room air with adequate spontaneous ventilation    Post pain: adequate analgesia    Post assessment: no apparent anesthetic complications and tolerated procedure well    Post vital signs: stable    Level of consciousness: awake, alert and oriented    Nausea/Vomiting: no nausea/vomiting    Complications: none    Transfer of care protocol was followed      Last vitals:   Visit Vitals  BP (!) 96/54   Pulse (!) 43   Temp 36.6 °C (97.8 °F)   Resp 16   Ht 5' 9" (1.753 m)   Wt 72.6 kg (160 lb)   SpO2 100%   BMI 23.63 kg/m²     "

## 2017-06-20 ENCOUNTER — TELEPHONE (OUTPATIENT)
Dept: ENDOSCOPY | Facility: HOSPITAL | Age: 27
End: 2017-06-20

## 2017-06-26 NOTE — PROGRESS NOTES
"     [facility]             Inflammatory Bowel Disease Follow-up  Note            Dr. Carmen Pulido  TODAY'S VISIT DATE:  6/26/2017    Chief Complaint:   Chief Complaint   Patient presents with    Other     chronic nonspecific colitis     PCP: Primary Doctor No    Previous History:  Jonas Morin is a 26 y.o. male with a diagnosis of nonspecific microscopic inflammation of the colon and terminal ileum (5/2008, 10/2008), colonoscopy with ileoscopy and biopsies normal 7/2015 in 2008.   Pertinent past medical history includes  sensory integration, fine motor disability, and auditory processing disorders, anxiety and OCD (on cymbalta), history of Lyme disease, and sporadic adenomatous colon polyps.  He initially began with symptoms of nonbloody diarrhea and vomiting in 2006 during a trip to [place].  The diarrhea persisted and worsened by May 2008 to 11 loose-watery BMs/day with urgency, intermittent blood, and no nocturnal BMs.  At that time he also reported an exacerbation of his SI auditory processing and cognitive problems and recommended gluten-free and casein-free diets.  During that same time he was also treated with supplements for a micronutrient deficiency.  His initial colonoscopy at [facility] with Dr. Cisneros on 5/15/2008 was visually unremarkable except for a rectal polyp that pathology described as a tubular adenoma.  Biopsies of his TI showed focal minimal acute enteritis and of his colon showed focal acute colitis with cryptitis, crypt abscesses, and neutrophils on the lamina propria.  EGD in 8/2008 was normal and biopsies were negative for celiac and H. Pylori though he was on a gluten free diet at that time.  He was treated with xifaxan, cipro, flagyl, and Imodium with no relief.  Colazal and bentyl were recommended but family deferred due to neurologic issues.  By fall 2008, he was on asacol and had a normal colonoscopy with a rectal polyp that showed "adenoma-like low grade " "dysplasia."   He continued to do well on asacol from 7970-8009 with 1-2 soft BMs/day.  In 2012 he was diagnosed with Lyme disease (symptoms since 2011) and was treated with Doxycycline for 3 weeks.  He had a 2nd opinion with Dr. MIRIAM Moreno  at the [facility] and took doxycycline for an additional 6 weeks.  His asacol was switched to delzicol 1.2 gm/BID in  and he remains on this dose currently.  He most recent colonoscopy in 2015 was normal with normal biopsies.  By spring 2016 his symptoms worsened to 5 loose BMs/day with lower abdominal cramping and urgency prior to a BM and these symptoms have continued for the last 7-8 months.    Interval History:  - current IBD meds: delzicol 1.2 grams BID  - 7 loose Bowel Movements/day with urgency  - 17 colonoscopy: Colon and TI normal. Biopsies throughout the colon showed some rare apoptosis but no active or chronic inflammation. The biopsies are nonspecific and drug or reactive/post-infectious conditions are considerations  - constitutional/GI symptoms: no fevers/chills, weight loss, dysphagia, GERD, abdominal pain  - extraintestinal manifestations: no eye pain/redness, skin lesions/rashes, liver problems, joint pain/swelling/stiffness    Pertinent Endoscopy/Imagin/15/2008 colonoscopy (per [facility] note--do not have actual report) (done by another GI MD) was visually unremarkable except for a rectal polyp which was described as a tubular adenoma and normal TI (path: focal minimal acute enteritis with rare neutrophils in the villous tips and crypts in the TI, focal acute colitis with cryptitis, crypt abscesses, and neutrophils on the lamina propria)  2008 EGD: normal (path: normal with negative celiac and H. pylori)  10/30/2008 Colonoscopy: normal with a rectal polyp (not discussed in report) (path-rectal polyp: adenoma-like low grade dysplasia) (path-TI: focal active enteritis with " intraepithelial lymphocytosis) (path-random colon: patchy active colitis)  6/2012 DEXA normal  7/13/2015 Dexa scan: normal  7/14/2015 colonoscopy: normal TI (path: normal); entire examined colon appeared normal (path: normal)  6/13/17 colonoscopy: Colon and TI normal. Biopsies throughout the colon showed some rare apoptosis but no active or chronic inflammation. The biopsies are nonspecific and drug or reactive/post-infectious conditions are considerations    Pertinent Labs:    Lab Results   Component Value Date    CRP <0.1 04/24/2017     No results found for: VXDERGER90TI  No results found for: HEPBIGM, HEPBCAB, HEPBSURFABQU  Lab Results   Component Value Date    VARICELLAZOS 4.29 (H) 04/24/2017    VARICELLAINT Positive (A) 04/24/2017     No results found for: NIL, TBAG, TBAGNIL, MITOGENNIL, TBGOLD  No results found for: TPMTRESULT  No results found for: ANSADAINIT, INFLIXIMAB, INFLIXINTERP     Prior IBD Therapies:  Xifaxan  Cipro  Flagyl  Imodium  Colazal  Asacol 800 mg TID    Current IBD Therapies:  Delzicol 1.2 grams BID    Vaccinations:  Flu shot: recommended yearly  Chickenpox status/Varicella vaccine: immune  Lab Results   Component Value Date    VARICELLAZOS 4.29 (H) 04/24/2017    VARICELLAINT Positive (A) 04/24/2017     Tetanus: recommended every 10 years  Pneumonia- PPV 13: NA  Pneumonia- PPV 23: NA  Hepatitis B: immune  Lab Results   Component Value Date    HEPBSAB Positive (A) 04/24/2017     Hepatitis A: defer to PCP  HPV: NA   Meningococcal: NA     Review of Systems   Constitutional: Negative for chills, fever and weight loss.   HENT:        No oral ulcers, dysphagia, oral thrush   Eyes: Negative for blurred vision, pain and redness.   Respiratory: Negative for cough and shortness of breath.    Cardiovascular: Negative for chest pain.   Gastrointestinal: Positive for diarrhea. Negative for abdominal pain, heartburn, nausea and vomiting.   Genitourinary: Negative for dysuria and hematuria.  "  Musculoskeletal: Negative for back pain and joint pain.   Skin: Negative for rash.   Psychiatric/Behavioral: Negative for depression. The patient is not nervous/anxious and does not have insomnia.        All Medical History/Surgical History/Family History/Social History/Allergies have been reviewed and updated in EMR    Review of patient's allergies indicates:   Allergen Reactions    Codeine Nausea And Vomiting       No outpatient prescriptions have been marked as taking for the 6/27/17 encounter (Appointment) with Carmen Pulido MD.       Vital Signs:             /72 (BP Location: Left arm, Patient Position: Sitting)   Pulse 60 Comment: ox:100  Temp 97.9 °F (36.6 °C)   Resp 14   Ht 5' 9" (1.753 m)   Wt 73 kg (160 lb 15 oz)   BMI 23.77 kg/m²     Physical Exam   Constitutional: He is oriented to person, place, and time. He appears well-developed.   HENT:   Mouth/Throat: Oropharynx is clear and moist. No oral lesions.   Eyes: Conjunctivae are normal. Pupils are equal, round, and reactive to light.   Cardiovascular: Normal rate and regular rhythm.    Pulmonary/Chest: Effort normal and breath sounds normal.   Abdominal: Soft. There is no tenderness.   Neurological: He is alert and oriented to person, place, and time.   Skin: No rash noted.   Psychiatric: He has a normal mood and affect.   Nursing note and vitals reviewed.      Labs: Reviewed and pertinent noted above    Assessment/Plan:  Jonas Morin is a 26 y.o. male with longstanding history of GI symptoms that began shortly after a trip to [facility] in 2006 with some microscopic acute inflammation of the small and large intestine seen on colonoscopy in 2008. The delizicol has likely been helping but subsequent colonoscopy in 7/2015 and 6/2017 are normal with no inflammation. Patient has ongoing diarrhea. I will proceed with stool studies though if negative we will treat him for irritable bowel syndrome. Today I had a long discussion regarding IBS " diagnosis and will plan on fiber first. If he does not get hel with this we can consider anti-diarrheals (immodium, lomotil, questran) or SIBO treatment.  We will continue to keep in touch on email regarding next steps in treatment.     # Diarrhea presumed IBS  - no evidence of thyroid or celiac disease  - colonoscopy 6/2017 unrevealing  - stool studies will be ordered including cultures, C diff, O/P, giardia/crypto     # Nonspecific microscopic inflammation of the colon and terminal ileum (5/2008, 10/2008), colonoscopy with ileoscopy and biopsies normal 7/2015  - will stop NSAIDS   - continue delzicol 1.2 grams BID  - drug monitoring: UA/Cr yearly- next due 4/2018    # High risk for colon cancer- rectal tubular adenomas removed (5/2008, 10/2008)   - last colonoscopy 7/2015 and 6/2017 normal  - repeat colonoscopy for surveillance every 5 years- next due 6/2022    Follow up: 1 year    Total visit time was 25 minutes, more than 50% of which was spent in face-to-face counseling with patient regarding evaluation and management goals and treatment options for colon polyps and presumed IBS    Carmen Pulido MD  Department of Gastroenterology  Medical Director, Inflammatory Bowel Disease

## 2017-06-27 ENCOUNTER — OFFICE VISIT (OUTPATIENT)
Dept: GASTROENTEROLOGY | Facility: CLINIC | Age: 27
End: 2017-06-27
Payer: COMMERCIAL

## 2017-06-27 VITALS
HEART RATE: 60 BPM | TEMPERATURE: 98 F | WEIGHT: 160.94 LBS | BODY MASS INDEX: 23.84 KG/M2 | SYSTOLIC BLOOD PRESSURE: 125 MMHG | DIASTOLIC BLOOD PRESSURE: 72 MMHG | HEIGHT: 69 IN | RESPIRATION RATE: 14 BRPM

## 2017-06-27 DIAGNOSIS — D72.819 LEUKOPENIA, UNSPECIFIED TYPE: Primary | ICD-10-CM

## 2017-06-27 DIAGNOSIS — R19.7 DIARRHEA, UNSPECIFIED TYPE: ICD-10-CM

## 2017-06-27 PROCEDURE — 99999 PR PBB SHADOW E&M-EST. PATIENT-LVL III: CPT | Mod: PBBFAC,,, | Performed by: INTERNAL MEDICINE

## 2017-06-27 PROCEDURE — 99214 OFFICE O/P EST MOD 30 MIN: CPT | Mod: S$GLB,,, | Performed by: INTERNAL MEDICINE

## 2017-06-28 ENCOUNTER — PATIENT MESSAGE (OUTPATIENT)
Dept: GASTROENTEROLOGY | Facility: CLINIC | Age: 27
End: 2017-06-28

## 2017-06-29 NOTE — TELEPHONE ENCOUNTER
Outside Labs: [facility]    Date of labs: 7/10/2015    CBC Results:    WBC    5.13  HEMOGLOBIN  16.5  HEMATOCRIT   47.8  MCV    87.1  PLATELETS   243          IBD MEDICATIONS CURRENTLY ON AND CURRENT DOSAGE (including Prednisone):    Delzicol 1.2 grams BID    IF CURRENTLY ON A BIOLOGIC, LAST DOSE AND NEXT SCHEDULED DOSE:    N/A      NEXT SCHEDULED APPOINTMENT:    NONE

## 2017-06-30 ENCOUNTER — TELEPHONE (OUTPATIENT)
Dept: GASTROENTEROLOGY | Facility: CLINIC | Age: 27
End: 2017-06-30

## 2017-06-30 NOTE — TELEPHONE ENCOUNTER
Called and spoke with pt  I explained after he left Dr Pulido decided she wanted to do stool studies. I told him I would put everything at the  and he can  next wk. I told him if he can not get here within 2 wks to let me know  He expressed understanding     Specimen container and orders at  for

## 2017-06-30 NOTE — TELEPHONE ENCOUNTER
----- Message from Carmen Pulido MD sent at 6/27/2017  5:31 PM CDT -----  Please schedule patient for stool studies- I decided to add on after the visit. Pt is aware you will be calling to schedule    SS

## 2017-09-18 RX ORDER — DULOXETIN HYDROCHLORIDE 30 MG/1
30 CAPSULE, DELAYED RELEASE ORAL DAILY
Qty: 30 CAPSULE | Refills: 11 | Status: SHIPPED | OUTPATIENT
Start: 2017-09-18 | End: 2018-09-18

## 2017-09-26 ENCOUNTER — PATIENT MESSAGE (OUTPATIENT)
Dept: GASTROENTEROLOGY | Facility: CLINIC | Age: 27
End: 2017-09-26

## 2017-09-26 DIAGNOSIS — K52.9 CHRONIC NONSPECIFIC COLITIS: Primary | ICD-10-CM

## 2017-09-26 RX ORDER — MESALAMINE 400 MG/1
800 CAPSULE, DELAYED RELEASE ORAL 3 TIMES DAILY
Qty: 180 CAPSULE | Refills: 11 | Status: SHIPPED | OUTPATIENT
Start: 2017-09-26 | End: 2017-10-26

## 2017-11-30 ENCOUNTER — OFFICE VISIT (OUTPATIENT)
Dept: INTERNAL MEDICINE | Facility: CLINIC | Age: 27
End: 2017-11-30
Payer: COMMERCIAL

## 2017-11-30 VITALS
BODY MASS INDEX: 24.82 KG/M2 | DIASTOLIC BLOOD PRESSURE: 68 MMHG | WEIGHT: 167.56 LBS | HEIGHT: 69 IN | SYSTOLIC BLOOD PRESSURE: 122 MMHG | HEART RATE: 89 BPM

## 2017-11-30 DIAGNOSIS — F32.9 MAJOR DEPRESSIVE DISORDER WITH SINGLE EPISODE, REMISSION STATUS UNSPECIFIED: ICD-10-CM

## 2017-11-30 DIAGNOSIS — F33.1 MODERATE EPISODE OF RECURRENT MAJOR DEPRESSIVE DISORDER: ICD-10-CM

## 2017-11-30 DIAGNOSIS — F42.9 OBSESSIVE-COMPULSIVE DISORDER, UNSPECIFIED TYPE: Primary | Chronic | ICD-10-CM

## 2017-11-30 PROCEDURE — 99214 OFFICE O/P EST MOD 30 MIN: CPT | Mod: S$GLB,,, | Performed by: INTERNAL MEDICINE

## 2017-11-30 PROCEDURE — 99999 PR PBB SHADOW E&M-EST. PATIENT-LVL IV: CPT | Mod: PBBFAC,,, | Performed by: INTERNAL MEDICINE

## 2017-11-30 RX ORDER — MESALAMINE 400 MG/1
400 CAPSULE, DELAYED RELEASE ORAL
COMMUNITY
Start: 2017-10-31

## 2017-11-30 NOTE — PROGRESS NOTES
Subjective:       Patient ID: Jonas Morin is a 27 y.o. male.    Chief Complaint: Annual Exam    HPI   Here for psych referral for OCD on cymbalta for the last 2 years. Had participated in CBT in [place] and had some minimal positive impact from that. Had previously been working for him but now experiencing more depression, lethargy and inability to concentrate. Reports feeling this way for the last 10 months since moving out from his roommate. Wakes up well refreshed. Works out 5 times per week. Difficult time enjoying or participating in hobbies such as writing. Appetite is ok. No SI or HI. No new acute stressors. Did move here roughly 15 months ago from [place] and has some stress from this. 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     Is not really interested in going up on the dose of cymbalta given that he had felt flat on higher doses in the past. Had tried zoloft in the past in high school and didn't really help him. Switched to cymbalta once he was diagnosed with lyme disease but is no longer having neuropathic pain or joint pain.     1. Little interest or pleasure in doing things?  Nearly every day           = 3  2. Feeling down, depressed, or hopeless?  Nearly every day           = 3  3. Trouble falling or staying asleep, or sleeping too much? Not at all                       = 0  4. Feeling tired or having little energy?  More than half of days  = 2  5. Poor appetite or overeating? Not at all                       = 0  6. Feeling bad about yourself- or that you are a failure or have let yourself or your family down?  Not at all                       = 0  7. Trouble concentrating on things, such as reading the newspaper or watching TV? Not at all                       = 0  8. Moving or speaking so slowly that other people could have noticed? Or the opposite- being so fidgety or restless that you have been moving around a lot more than usual? Not at all                        = 0  9. Thoughts that you would be better off dead or of hurting yourself in some way?  Not at all                       = 0  Total Score: 8     How difficult have these problems made it for you to do your work, take care of things at home, or get along with other people?     Scale:   0-4= No intervention  5-9= Recheck next visit and make patient aware of resources  10-14= Call  and consider initiation of antidepressant with MD  15-19= Call  to refer to Psychiatry and start antidepressant  20-27= Call social work and consult Psychiatry      Review of Systems   Constitutional: Negative for chills and fever.   HENT: Negative for congestion.    Eyes: Negative for visual disturbance.   Respiratory: Negative for shortness of breath.    Cardiovascular: Negative for chest pain.   Gastrointestinal: Positive for diarrhea. Negative for abdominal pain.   Musculoskeletal: Negative for arthralgias and myalgias.   Skin: Negative for rash.   Psychiatric/Behavioral: Positive for dysphoric mood.       Objective:       Vitals:    11/30/17 1533   BP: 122/68   Pulse: 89       Physical Exam   Constitutional: He is oriented to person, place, and time. He appears well-developed and well-nourished.   HENT:   Head: Normocephalic and atraumatic.   Right Ear: External ear normal.   Left Ear: External ear normal.   Eyes: EOM are normal. Pupils are equal, round, and reactive to light.   Neck: Normal range of motion. Neck supple. No JVD present.   Cardiovascular: Normal rate, regular rhythm, normal heart sounds and intact distal pulses.    No murmur heard.  Pulmonary/Chest: Effort normal and breath sounds normal. He exhibits no tenderness.   Abdominal: Soft. Bowel sounds are normal. There is no tenderness.   Musculoskeletal: Normal range of motion. He exhibits no edema.   Neurological: He is alert and oriented to person, place, and time.   Skin: Skin is warm and dry.   Psychiatric: He has a normal mood and  affect. His behavior is normal.   Vitals reviewed.      Assessment:       1. Obsessive-compulsive disorder, unspecified type      This is a 26 yo gentleman with history of OCD previously controlled with cymbalta who is presenting with worsening depression, lethargy and concentration.  Plan:       Obsessive-compulsive disorder, unspecified type  -     Ambulatory Referral to Psychiatry  -     Ambulatory Referral to Psychology for possible CBT  -     Will hold off on making medication adjustments given patients complicated past medication history and lack of desire to have cymbalta dose increased given negative side effects     Carolyn Garcia MD, PGY3  Pager [phone]  Discussed with Dr. Wilson

## 2017-11-30 NOTE — Clinical Note
Dr Garcia - Please review your note and edit to include an assessment, and ideally more of a statement of your plan than just the auto-imported orders.  When do you want him to return for regular primary care follow up? Thanks! Jatin Wilson MD

## 2018-02-07 ENCOUNTER — OFFICE VISIT (OUTPATIENT)
Dept: URGENT CARE | Facility: CLINIC | Age: 28
End: 2018-02-07
Payer: COMMERCIAL

## 2018-02-07 VITALS
SYSTOLIC BLOOD PRESSURE: 120 MMHG | RESPIRATION RATE: 20 BRPM | OXYGEN SATURATION: 96 % | BODY MASS INDEX: 24.44 KG/M2 | HEART RATE: 80 BPM | DIASTOLIC BLOOD PRESSURE: 72 MMHG | WEIGHT: 165 LBS | TEMPERATURE: 97 F | HEIGHT: 69 IN

## 2018-02-07 DIAGNOSIS — H61.23 BILATERAL IMPACTED CERUMEN: Primary | ICD-10-CM

## 2018-02-07 PROCEDURE — 69209 REMOVE IMPACTED EAR WAX UNI: CPT | Mod: 50,S$GLB,, | Performed by: EMERGENCY MEDICINE

## 2018-02-07 PROCEDURE — 99214 OFFICE O/P EST MOD 30 MIN: CPT | Mod: 25,S$GLB,, | Performed by: EMERGENCY MEDICINE

## 2018-02-07 PROCEDURE — 3008F BODY MASS INDEX DOCD: CPT | Mod: S$GLB,,, | Performed by: EMERGENCY MEDICINE

## 2018-02-07 NOTE — PROCEDURES
Ear Cerumen Removal  Date/Time: 2/7/2018 4:59 PM  Performed by: ANDREA HAMPTON  Authorized by: ANDREA HAMPTON     Ceruminolytics applied: Ceruminolytics applied prior to the procedure    Medication Used:  Cerumenex  Location details:  Both ears  Procedure type: irrigation    Cerumen  Removal Results:  Cerumen completely removed     Irrigation used initially but then finished with cerumen spoon for right ear. TMS normal after the irrigation

## 2018-02-07 NOTE — PROGRESS NOTES
"Subjective:       Patient ID: Jonas Morin is a 27 y.o. male.    Vitals:  height is 5' 9" (1.753 m) and weight is 74.8 kg (165 lb). His temperature is 97 °F (36.1 °C). His blood pressure is 120/72 and his pulse is 80. His respiration is 20 and oxygen saturation is 96%.     Chief Complaint: Otalgia    Pt with wax impaction He tried using debrox but it got worse      Otalgia    There is pain in the left ear. This is a new problem. The current episode started in the past 7 days. The problem occurs constantly. The problem has been gradually worsening. There has been no fever. The fever has been present for less than 1 day. The pain is at a severity of 6/10. The pain is moderate. Associated symptoms include hearing loss. Pertinent negatives include no abdominal pain, diarrhea, headaches, rash, sore throat or vomiting. Treatments tried: Debrox. The treatment provided mild relief.     Review of Systems   Constitution: Negative for chills and fever.   HENT: Positive for ear pain and hearing loss. Negative for sore throat.    Eyes: Negative for blurred vision.   Cardiovascular: Negative for chest pain.   Respiratory: Negative for shortness of breath.    Skin: Negative for rash.   Musculoskeletal: Negative for back pain and joint pain.   Gastrointestinal: Negative for abdominal pain, diarrhea, nausea and vomiting.   Neurological: Negative for headaches.   Psychiatric/Behavioral: The patient is not nervous/anxious.        Objective:      Physical Exam   Constitutional: He is oriented to person, place, and time. He appears well-developed and well-nourished.   HENT:   Head: Normocephalic and atraumatic.   Nose: Nose normal.   Bilateral cerumen impaction and Tms not seen initially   Eyes: EOM are normal. Pupils are equal, round, and reactive to light.   Neck: Normal range of motion. Neck supple.   Cardiovascular: Normal rate and regular rhythm.    Pulmonary/Chest: Breath sounds normal.   Musculoskeletal: Normal range of motion. "   Neurological: He is alert and oriented to person, place, and time.   Skin: Skin is warm and dry.       Assessment:       1. Bilateral impacted cerumen        Plan:         Bilateral impacted cerumen  -     EAR CERUMEN REMOVAL

## 2018-02-07 NOTE — PATIENT INSTRUCTIONS
Earwax (Treated)    Everyone produces earwax from the lining of the ear canal. It lubricates and protects the ear. The wax that forms in the canal slowly moves toward the outside of the ear and falls out. Sometimes wax can build up in the ear canal. This can cause a blockage and loss of hearing. A buildup of earwax was removed from your ear today.  Home care  If you have a tendency to build up wax in the ear canal, you should clear the wax at home regularly, before it causes discomfort. This should be about once every six months.  · Unless a medicine was prescribed, you may use an over-the-counter product made for clearing earwax. These contain carbamide peroxide and are available over-the-counter in a kit with a small bulb syringe.  · Lie down with the blocked ear facing upward. Apply one dropper full of medicine and wait a few minutes. Grasp the outer ear and wiggle it to help the solution enter the canal.  · Lean over a sink or basin with the blocked ear turned downward. Use a rubber bulb syringe filled with warm (not hot or cold) water to rinse the ear several times. Use gentle pressure only. You may need to repeat the irrigation several times before the wax flows out.  · If you are having trouble draining all the water out of your ear canal, put a few drops of rubbing alcohol into the ear canal. This will help remove the remaining water.  Don'ts  · Dont use cold water to rinse the ear. This will make you dizzy.  · Dont do this procedure if you have an ear infection. Symptoms include ear pain, fever, or fluid draining from the ear.  · Dont do this procedure if you have a punctured eardrum.  · Dont use cotton swabs, matches, hairpins, keys, or other objects to clean the ear canal. This can cause infection of the ear canal or rupture of the eardrum. Because of their size and shape, cotton swabs can push the earwax deeper into the ear canal instead of removing it.  Follow-up care  Follow up with your  healthcare provider, or as advised.  When to seek medical advice  Call your healthcare provider right away if any of these occur:  · Worsening ear pain  · Fever of 100.4°F (38°C) or higher, or as directed by your healthcare provider  · Hearing does not return to normal after three days of treatment  · Fluid drainage or bleeding from the ear canal  · Swelling, redness, or tenderness of the outer ear  · Headache, neck pain, or stiff neck  Date Last Reviewed: 3/22/2015  © 8905-2036 [employer]. [address] [place] [id number]. All rights reserved. This information is not intended as a substitute for professional medical care. Always follow your healthcare professional's instructions.